# Patient Record
Sex: FEMALE | Race: BLACK OR AFRICAN AMERICAN | NOT HISPANIC OR LATINO | Employment: PART TIME
[De-identification: names, ages, dates, MRNs, and addresses within clinical notes are randomized per-mention and may not be internally consistent; named-entity substitution may affect disease eponyms.]

---

## 2021-06-12 ENCOUNTER — HEALTH MAINTENANCE LETTER (OUTPATIENT)
Age: 61
End: 2021-06-12

## 2021-06-17 ENCOUNTER — THERAPY VISIT (OUTPATIENT)
Dept: PHYSICAL THERAPY | Facility: CLINIC | Age: 61
End: 2021-06-17
Payer: COMMERCIAL

## 2021-06-17 DIAGNOSIS — M54.50 MIDLINE LOW BACK PAIN: ICD-10-CM

## 2021-06-17 PROCEDURE — 97110 THERAPEUTIC EXERCISES: CPT | Mod: GP | Performed by: PHYSICAL THERAPIST

## 2021-06-17 PROCEDURE — 97161 PT EVAL LOW COMPLEX 20 MIN: CPT | Mod: GP | Performed by: PHYSICAL THERAPIST

## 2021-06-17 NOTE — PROGRESS NOTES
Physical Therapy Initial Evaluation  Subjective:  The history is provided by the patient. No  was used.   Therapist Generated HPI Evaluation  Problem details: Pt reports onset of low back pain about two months ago (April 2021) without known cause. Changed her mattress thinking it would get better but it didn't change it. Saw MD in May and referred to PT. .         Type of problem:  Lumbar.    This is a recurrent condition.  Condition occurred with:  Insidious onset.  Where condition occurred: for unknown reasons.  Patient reports pain:  Mid lumbar spine and central lumbar spine.  Pain is described as burning and is intermittent.  Pain radiates to:  No radiation. Pain is worse in the A.M..  Since onset symptoms are unchanged.  Associated symptoms:  Loss of motion/stiffness. Symptoms are exacerbated by standing and walking  and relieved by NSAID's.      Barriers include:  None as reported by patient.    Patient Health History         Pain is reported as 7/10 on pain scale.  General health as reported by patient is excellent.  Pertinent medical history includes: high blood pressure.   Red flags:  None as reported by patient.  Medical allergies: none.   Surgeries include:  Other (hysterectomy).    Current medications:  High blood pressure medication and pain medication.    Current occupation is nursing.   Primary job tasks include:  Prolonged standing.                                    Objective:  System         Lumbar/SI Evaluation    Lumbar Myotomes:  normal                                                                           Hamzah Lumbar Evaluation    Posture:  Sitting: fair    Lordosis: WNL  Lateral Shift: nil  Correction of Posture: better    Movement Loss:  Flexion (Flex): nil and pain  Extension (EXT): min and pain  Side [place] R (SG R): nil  Side Glide L (SG L): nil  Test Movements:        EIL: During: no effect  After: no effect  Mechanical Response: no effect  Repeat EIL: During:  decreases  After: better  Mechanical Response: IncROM        Conclusion: derangement  Principle of Treatment:  Posture Correction: lumbar support    Extension: EIL 10-15 reps every 2-3 hrs                                           ROS    Assessment/Plan:    Patient is a 61 year old female with lumbar complaints.    Patient has the following significant findings with corresponding treatment plan.                Diagnosis 1:  LBP-Central symmetrical above knee derangement  Pain -  manual therapy, self management, education, directional preference exercise and home program  Decreased ROM/flexibility - manual therapy, therapeutic exercise, therapeutic activity and home program  Inflammation - self management/home program  Decreased function - therapeutic activities, home program and functional performance testing  Impaired posture - neuro re-education, therapeutic activities and home program      Low complexity using standardized patient assessment instrument and/or measureable assessment of functional outcome.  Cumulative Therapy Evaluation is: Low complexity.    Previous and current functional limitations:  (See Goal Flow Sheet for this information)    Short term and Long term goals: (See Goal Flow Sheet for this information)     Communication ability:  Patient appears to be able to clearly communicate and understand verbal and written communication and follow directions correctly.  Treatment Explanation - The following has been discussed with the patient:   RX ordered/plan of care  Anticipated outcomes  Possible risks and side effects  This patient would benefit from PT intervention to resume normal activities.   Rehab potential is excellent.    Frequency:  1 X week, once daily  Duration:  for 6 weeks  Discharge Plan:  Achieve all LTG.  Independent in home treatment program.  Reach maximal therapeutic benefit.    Please refer to the daily flowsheet for treatment today, total treatment time and time spent performing  1:1 timed codes.

## 2021-06-17 NOTE — LETTER
[facility]  [id number]  [place] [address]  [phone]    2021    Re: Nya Escobedo   :   1960  MRN:  [id number]   REFERRING PHYSICIAN:   Hortencia AHUMADA [facility]    Date of Initial Evaluation: 2021  Visits:  Rxs Used: 1  Reason for Referral:  Midline low back pain    EVALUATION SUMMARY    Physical Therapy Initial Evaluation  Subjective:  The history is provided by the patient. No  was used.   Therapist Generated HPI Evaluation  Problem details: Pt reports onset of low back pain about two months ago (2021) without known cause. Changed her mattress thinking it would get better but it didn't change it. Saw MD in May and referred to PT. .         Type of problem:  Lumbar.  This is a recurrent condition.  Condition occurred with:  Insidious onset.  Where condition occurred: for unknown reasons.  Patient reports pain:  Mid lumbar spine and central lumbar spine.  Pain is described as burning and is intermittent.  Pain radiates to:  No radiation. Pain is worse in the A.M..  Since onset symptoms are unchanged.  Associated symptoms:  Loss of motion/stiffness. Symptoms are exacerbated by standing and walking  and relieved by NSAID's.  Barriers include:  None as reported by patient.    Patient Health History  Pain is reported as 7/10 on pain scale.  General health as reported by patient is excellent.  Pertinent medical history includes: high blood pressure.   Red flags:  None as reported by patient.  Medical allergies: none.   Surgeries include:  Other (hysterectomy).    Current medications:  High blood pressure medication and pain medication.    Current occupation is nursing.   Primary job tasks include:  Prolonged standing.      Lumbar/SI Evaluation  Lumbar Myotomes:  normal    Hamzah Lumbar Evaluation  Posture:  Sitting: fair  Lordosis: WNL  Lateral  Shift: nil  Correction of Posture: better  Movement Loss:  Flexion (Flex): nil and pain  Extension (EXT): min and pain  Side [place] R (SG R): nil  Side Glide L (SG L): nil  Re: Nya Escobedo   :   1960      Test Movements:  EIL: During: no effect  After: no effect  Mechanical Response: no effect  Repeat EIL: During: decreases  After: better  Mechanical Response: IncROM  Conclusion: derangement  Principle of Treatment:  Posture Correction: lumbar support  Extension: EIL 10-15 reps every 2-3 hrs    Assessment/Plan:    Patient is a 61 year old female with lumbar complaints.    Patient has the following significant findings with corresponding treatment plan.                Diagnosis 1:  LBP-Central symmetrical above knee derangement  Pain -  manual therapy, self management, education, directional preference exercise and home program  Decreased ROM/flexibility - manual therapy, therapeutic exercise, therapeutic activity and home program  Inflammation - self management/home program  Decreased function - therapeutic activities, home program and functional performance testing  Impaired posture - neuro re-education, therapeutic activities and home program  Low complexity using standardized patient assessment instrument and/or measureable assessment of functional outcome.  Cumulative Therapy Evaluation is: Low complexity.  Previous and current functional limitations:  (See Goal Flow Sheet for this information)    Short term and Long term goals: (See Goal Flow Sheet for this information)   Communication ability:  Patient appears to be able to clearly communicate and understand verbal and written communication and follow directions correctly.  Treatment Explanation - The following has been discussed with the patient:   RX ordered/plan of care  Anticipated outcomes  Possible risks and side effects  This patient would benefit from PT intervention to resume normal activities.   Rehab potential is excellent.    Frequency:   1 X week, once daily  Duration:  for 6 weeks  Discharge Plan:  Achieve all LTG.  Independent in home treatment program.  Reach maximal therapeutic benefit.    Thank you for your referral.      INQUIRIES  Therapist: Dominick Fraire DPT   [address]  [facility] [address]  Phone: [phone]  Fax: [phone]

## 2021-08-08 PROBLEM — M54.50 MIDLINE LOW BACK PAIN: Status: RESOLVED | Noted: 2021-06-17 | Resolved: 2021-08-08

## 2021-09-26 ENCOUNTER — HEALTH MAINTENANCE LETTER (OUTPATIENT)
Age: 61
End: 2021-09-26

## 2023-01-14 ENCOUNTER — HEALTH MAINTENANCE LETTER (OUTPATIENT)
Age: 63
End: 2023-01-14

## 2023-09-24 ENCOUNTER — HEALTH MAINTENANCE LETTER (OUTPATIENT)
Age: 63
End: 2023-09-24

## 2024-02-11 ENCOUNTER — HEALTH MAINTENANCE LETTER (OUTPATIENT)
Age: 64
End: 2024-02-11